# Patient Record
Sex: FEMALE | Race: WHITE | URBAN - METROPOLITAN AREA
[De-identification: names, ages, dates, MRNs, and addresses within clinical notes are randomized per-mention and may not be internally consistent; named-entity substitution may affect disease eponyms.]

---

## 2019-01-10 ENCOUNTER — HOSPITAL ENCOUNTER (OUTPATIENT)
Dept: CARDIOLOGY | Facility: CLINIC | Age: 54
Discharge: HOME | End: 2019-01-10
Admitting: RADIOLOGY
Payer: COMMERCIAL

## 2019-01-10 DIAGNOSIS — Z82.49 FAMILY HISTORY OF HEART DISEASE: ICD-10-CM

## 2019-01-10 DIAGNOSIS — E78.00 HIGH CHOLESTEROL: ICD-10-CM

## 2019-01-10 PROCEDURE — 75571 CT HRT W/O DYE W/CA TEST: CPT | Mod: 26 | Performed by: INTERNAL MEDICINE

## 2019-01-10 PROCEDURE — 75571 CT HRT W/O DYE W/CA TEST: CPT

## 2019-01-11 NOTE — PROGRESS NOTES
Called pt to review CT calcium screening. Informed pt that there was an incidental finding of a calcified left lower lobe granuloma noted on the scan. Discussed results of the calcium score. Left main coronary artery 0, LAD 14.51, Circumflex 0, RCA 2.75. Total score 17.25. Advised pt to follow up with PMD for further recommendations. Copy sent to pt.